# Patient Record
Sex: MALE | Race: WHITE
[De-identification: names, ages, dates, MRNs, and addresses within clinical notes are randomized per-mention and may not be internally consistent; named-entity substitution may affect disease eponyms.]

---

## 2018-02-19 ENCOUNTER — HOSPITAL ENCOUNTER (EMERGENCY)
Dept: HOSPITAL 80 - FED | Age: 78
Discharge: HOME | End: 2018-02-19
Payer: COMMERCIAL

## 2018-02-19 VITALS — RESPIRATION RATE: 15 BRPM

## 2018-02-19 VITALS — OXYGEN SATURATION: 95 % | SYSTOLIC BLOOD PRESSURE: 146 MMHG | HEART RATE: 100 BPM | DIASTOLIC BLOOD PRESSURE: 105 MMHG

## 2018-02-19 VITALS — TEMPERATURE: 98.1 F
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
## 2018-02-19 DIAGNOSIS — S06.0X1A: Primary | ICD-10-CM

## 2018-02-19 DIAGNOSIS — S01.01XA: ICD-10-CM
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
## 2018-02-19 DIAGNOSIS — I48.91: ICD-10-CM
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
## 2018-02-19 LAB
INR PPP: 1.56 (ref 0.83–1.16)
PLATELET # BLD: 92 10^3/UL (ref 150–400)
PROTHROMBIN TIME: 18.8 SEC (ref 12–15)

## 2018-02-19 PROCEDURE — 0HQ0XZZ REPAIR SCALP SKIN, EXTERNAL APPROACH: ICD-10-PCS

## 2018-02-19 NOTE — EDPHY
General


Time Seen by Provider: 02/19/18 10:55


Narrative: 





CHIEF COMPLAINT: 


Fall, head injury





HISTORY OF PRESENT ILLNESS: 


Patient arrives by EMS and is seen at time of arrival.  Patient arrives with 

complaints of mechanical fall and head injury.  He was walking across and icy 

driveway when he slipped and fell.  He reportedly struck the back of his head.  

His son is at bedside.  The son was in the house but the patient's 2nd son was 

outside witness the fall.  They describe loss of consciousness of less than 10 

sec.  He complains of a mild headache and a laceration to posterior scalp.  He 

has no neck pain.  No chest pain or back pain.  No abdominal pain.  No injuries 

to the arms or legs.  He was nauseated riding backwards in the ambulance but he 

has not vomited.  He has no visual disturbance.  The son at bedside reports 

some repetitive questioning at the house.  He does take Eliquis for atrial 

fibrillation.  He is awake and alert with no complaints otherwise.  No other 

associated complaints or modifying factors





REVIEW OF SYSTEMS:


Ten systems reviewed and are negative unless otherwise noted in the HPI





PCP:








SPECIALISTS:








PAST MEDICAL HISTORY: 


Multiple comorbidities.  Positive atrial fibrillation on Eliquis





PAST SURGICAL HISTORY:


No recent surgeries





SOCIAL HISTORY:


Nonsmoker.  Lives in [place] with his family





FAMILY HISTORY:


Noncontributory





EXAMINATION


General Appearance:  Alert, no distress


Head: normocephalic.  Posterior scalp laceration as below.  No depression.  No 

Busby sign.  No raccoon eyes.


Eyes:  Pupils equal and round, no conjunctival pallor or injection.  EOMs intact


ENT, Mouth:  Mucous membranes moist


Neck:  C-collar in place.  The trachea is midline.  There is no crepitus of the 

visualized skin


Respiratory:  Lungs are clear to auscultation.  No wheeze, rhonchi or crackles


Cardiovascular:  Irregularly irregular rhythm with regular rate.  No murmur.  

Pulses are symmetric radial and DP.


Gastrointestinal:  Abdomen is soft and nontender


Back: non-tender, no bony abnormalities


Neurological:  Alert to person place and time.  GCS 15. Strength is symmetric 

in all 4 limbs.  No focal deficits.


Skin:  Warm and dry, no rash.  There is a 3 cm laceration of the posterior 

scalp over the occiput.  No active bleeding.  No foreign body.  No injury to 

the galea.


Extremities:  Nontender, no pedal edema


Psychiatric:  Mood and affect normal





DIFFERENTIAL DIAGNOSES:


Including but not limited to intracranial hemorrhage, concussion, basilar skull 

fracture, occipital fracture, scalp laceration








MDM:


11:00 a.m.


Mechanical fall with closed head injury, scalp laceration and patient takes 

Eliquis.  There was positive LOC that was brief.  He is awake and alert no 

acute distress.  CT scan of the head and neck have been ordered as soon as 

possible.  There was a delay getting the patient into the electronic medical 

record. 





11:30 a.m.


Patient re-evaluated.  He has return from CT scan but not yet interpreted





12:00 p.m.


I have re-evaluated the patient is awake alert no acute distress.  Atrial 

fibrillation on the monitor.  More family members at bedside.  I discussed 

laboratory studies and my interpretation of the CT scans which is no acute 

findings.  Official interpretation pending.





12:40 p.m.


Notified by RN that the patient has gone into rapid ventricular response.  I 

evaluated the patient I will discuss with Dr. Truong.





12:45 p.m.


I re-evaluated the patient.  His heart rate is now 108-120 beats per minute





12:50 p.m.


Notified by Radiology that there are no acute findings on CT scans of the head 

or cervical spine.  I have re-evaluated the patient moves C-collar at this 

time.  I have anesthetize the area and proceed with irrigation closure.  The 

patient informs me that he takes 75 mg of Toprol in the morning and does not 

have an evening dose.  He has taken his medication today.  I discussed with Dr. Truong.  We will check a troponin and consult Cardiology.  He is awake alert 

no acute distress.  His blood pressure is normal at 130/78.





1:20 p.m.


Troponin is negative.  I will consult Cardiology this time.





1:35 p.m.


Case discussed with the on-call cardiology provider, Bree.  We discussed the 

patient's medication in it, current vitals.  She is requesting to know the form 

of metoprolol the patient is on.  I will contact the patient's pharmacy to 

verify.





1:55 p.m.


Discussed with the patient's pharmacy.  He has been prescribed metoprolol 

succinate 50 mg, scheduled to take 75 mg p.o. Daily.  I discussed this with the 

cardiology PA, Bree.  She recommends that we add an additional 25 mg of 

metoprolol succinate to the patient.  She recommends no IV treatment at this 

time.  She recommends follow up with the patient's cardiology upon return home.





2:10 p.m.


I re-evaluated the patient.  I updated them regarding the change in the 

medication.  I updated him regarding wound care.  We discussed staple removal 

in 7-10 days with their primary care physician in [place].  We discussed close 

follow-up with cardiologist upon return home to . We discussed ED 

precautions.  We will perform a road test him proceed with discharge home.





3:10 p.m.


At this time the patient has been ambulated successfully.  He is walking 

without difficulty.  He is stable for discharge home and leaving at this time.








PROCEDURE: Laceration repair


Consent:  Verbal


Location:  Scalp Occiput


Length of repair:  3 cm


Complexity:  Simple


Layer involvement:  Single


Anesthesia:  Local.  1% lidocaine with epinephrine.  5 mL


Irrigation:  Extensive


Debridement:  None


Procedure description:  Following good anesthesia, the wound was copiously 

irrigated.  Wound bed was explored with a sterile glove, and there is no 

foreign body noted.  Wound borders were approximated well with good hemostasis.

  Tolerated well without complication.


Suture/Staple material: 4  Staples


Wound care:  Routine as discussed


Suture/Staple removal:   Days





SUPERVISION:


Patient was evaluated and examined in conjunction with my secondary supervising 

physician as documented. We have both examined the patient.





 (Sorin So)


Discussion: 





The patient was evaluated and managed by the Physician Assistant.  I discussed 

the patient's presentation and course with the midlevel provider with them and 

agree with the evaluation.  My co-signature indicates that I have reviewed this 

chart and I agree with the findings and plan of care as documented.  I am the 

secondary supervising physician. (Mimi Truogn)





- Objective


Vital Signs: 





 Initial Vital Signs











Temperature (C)  36.4 C   02/19/18 10:50


 


Heart Rate  92   02/19/18 10:50


 


Respiratory Rate  20   02/19/18 10:50


 


Blood Pressure  152/98 H  02/19/18 10:50


 


O2 Sat (%)  92   02/19/18 10:50








 











O2 Delivery Mode               Room Air














Allergies/Adverse Reactions: 


 





No Known Allergies Allergy (Unverified 02/19/18 11:08)


 








Home Medications: 














 Medication  Instructions  Recorded


 


Eliquis  02/19/18


 


Metoprolol Succinate Xr [Toprol Xl 25 mg PO DAILY #30 tab.sr 02/19/18





25 mg (*)]  


 


Statin  02/19/18


 


Toprol Xl  02/19/18











Laboratory Results: 





 Laboratory Results





 02/19/18 11:16 





 02/19/18 11:16 








Medications Given: 





 








Discontinued Medications





Metoprolol Succinate (Toprol Xl)  25 mg PO EDNOW ONE


   Stop: 02/19/18 14:00


   Last Admin: 02/19/18 14:14 Dose:  25 mg








Departure





- Departure


Disposition: Home, Routine, Self-Care


Clinical Impression: 


 Fall, Closed head injury with brief loss of consciousness, Atrial fibrillation 

with rapid ventricular response, Scalp laceration





Condition: Good


Instructions:  A-fib (Atrial Fibrillation) (ED), Concussion (ED), Head Injury (

ED)


Additional Instructions: 


1. Contact your cardiologist in [place] to discuss further medication


2. Follow up with primary care physician upon return home for re-evaluation of 

the closed head injury


3. ED precautions as discussed 


Referrals: 


Patient,NotPresent [Unknown] - As per Instructions


Prescriptions: 


Metoprolol Succinate Xr [Toprol Xl 25 mg (*)] 25 mg PO DAILY #30 tab.sr

## 2024-08-29 NOTE — CPEKG
Heart Rate: 90

RR Interval: 667

QRSD Interval: 94

QT Interval: 392

QTC Interval: 480

QRS Axis: 73

T Wave Axis: 34

EKG Severity - ABNORMAL ECG -

EKG Impression: ATRIAL FIBRILLATION, V-RATE 

EKG Impression: BORDERLINE PROLONGED QT INTERVAL

Electronically Signed By: Mimi Truong 19-Feb-2018 17:19:52 Emergent